# Patient Record
Sex: FEMALE
[De-identification: names, ages, dates, MRNs, and addresses within clinical notes are randomized per-mention and may not be internally consistent; named-entity substitution may affect disease eponyms.]

---

## 2018-04-17 ENCOUNTER — HOSPITAL ENCOUNTER (EMERGENCY)
Dept: HOSPITAL 97 - ER | Age: 33
Discharge: HOME | End: 2018-04-17
Payer: SELF-PAY

## 2018-04-17 DIAGNOSIS — N39.0: Primary | ICD-10-CM

## 2018-04-17 LAB — UA COMPLETE W REFLEX CULTURE PNL UR: (no result)

## 2018-04-17 PROCEDURE — 74018 RADEX ABDOMEN 1 VIEW: CPT

## 2018-04-17 PROCEDURE — 87088 URINE BACTERIA CULTURE: CPT

## 2018-04-17 PROCEDURE — 81025 URINE PREGNANCY TEST: CPT

## 2018-04-17 PROCEDURE — 99284 EMERGENCY DEPT VISIT MOD MDM: CPT

## 2018-04-17 PROCEDURE — 81003 URINALYSIS AUTO W/O SCOPE: CPT

## 2018-04-17 PROCEDURE — 87086 URINE CULTURE/COLONY COUNT: CPT

## 2018-04-17 PROCEDURE — 81015 MICROSCOPIC EXAM OF URINE: CPT

## 2018-04-17 NOTE — RAD REPORT
EXAM DESCRIPTION:  RAD - Abdomen 1 View (KUB) - 4/17/2018 2:51 am

 

CLINICAL HISTORY:  Hematuria

 

COMPARISON:  None.

 

FINDINGS:  No bowel obstruction, free air or pneumatosis. Large stool volume fills the right-side of 
the colon. No renal or ureteral calculi identifiable.

 

No significant bony findings

 

IMPRESSION:  No  calculi identifiable.

 

Large stool volume fills the right-side of the colon.

## 2018-04-17 NOTE — EDPHYS
Physician Documentation                                                                           

 [facility]                                                                

Name: Edwina Roe                                                                                

Age: 32 yrs                                                                                       

Sex: Female                                                                                       

: 1985                                                                                   

MRN: [id number]                                                                                   

Arrival Date: 2018                                                                          

Time: 02:04                                                                                       

Account#: [id number]                                                                            

Bed 17                                                                                            

Private MD:                                                                                       

ED Physician Richard Montoya                                                                      

HPI:                                                                                              

                                                                                             

02:28 This 32 yrs old  Female presents to ER via Ambulatory with complaints of        kb  

      Urinary Problem.                                                                            

02:28 The patient presents with urinary symptoms, dysuria, frequency, hematuria. Onset: The   kb  

      symptoms/episode began/occurred yesterday. Modifying factors: The symptoms are              

      alleviated by nothing, the symptoms are aggravated by urinating. Associated signs and       

      symptoms: Pertinent positives: dysuria, urinary frequency. Severity of symptoms: At         

      their worst the symptoms were mild, moderate, in the emergency department the symptoms      

      are unchanged. The patient has not experienced similar symptoms in the past. The            

      patient has not recently seen a physician.                                                  

                                                                                                  

OB/GYN:                                                                                           

02:09 LMP 3/20/2018                                                                           aa1 

                                                                                                  

Historical:                                                                                       

- Allergies:                                                                                      

02:09 No Known Allergies;                                                                     aa1 

- Home Meds:                                                                                      

02:09 None [Active];                                                                          aa1 

- PMHx:                                                                                           

02:09 vitamin D deficiency;                                                                   aa1 

- PSHx:                                                                                           

02:09 None;                                                                                   aa1 

                                                                                                  

- Immunization history:: Flu vaccine is not up to date.                                           

- Social history:: Smoking status: Patient/guardian denies using tobacco.                         

                                                                                                  

                                                                                                  

ROS:                                                                                              

02:27 Constitutional: Negative for fever, chills, and weight loss, Cardiovascular: Negative   kb  

      for chest pain, palpitations, and edema, Respiratory: Negative for shortness of breath,     

      cough, wheezing, and pleuritic chest pain, Abdomen/GI: Negative for abdominal pain,         

      nausea, vomiting, diarrhea, and constipation, MS/Extremity: Negative for injury and         

      deformity, Skin: Negative for injury, rash, and discoloration, Neuro: Negative for          

      headache, weakness, numbness, tingling, and seizure.                                        

02:27 : Positive for urinary symptoms, urinary frequency, hematuria, burning with urination.    

                                                                                                  

Exam:                                                                                             

02:27 Constitutional:  This is a well developed, well nourished patient who is awake, alert,  kb  

      and in no acute distress. Head/Face:  Normocephalic, atraumatic. Chest/axilla:  Normal      

      chest wall appearance and motion.  Nontender with no deformity.  No lesions are             

      appreciated. Cardiovascular:  Regular rate and rhythm with a normal S1 and S2.  No          

      gallops, murmurs, or rubs.  Normal PMI, no JVD.  No pulse deficits. Respiratory:  Lungs     

      have equal breath sounds bilaterally, clear to auscultation and percussion.  No rales,      

      rhonchi or wheezes noted.  No increased work of breathing, no retractions or nasal          

      flaring. Abdomen/GI:  Soft, non-tender, with normal bowel sounds.  No distension or         

      tympany.  No guarding or rebound.  No evidence of tenderness throughout. Skin:  Warm,       

      dry with normal turgor.  Normal color with no rashes, no lesions, and no evidence of        

      cellulitis. MS/ Extremity:  Pulses equal, no cyanosis.  Neurovascular intact.  Full,        

      normal range of motion. Neuro:  Awake and alert, GCS 15, oriented to person, place,         

      time, and situation.  Cranial nerves II-XII grossly intact.  Motor strength 5/5 in all      

      extremities.  Sensory grossly intact.  Cerebellar exam normal.  Normal gait.                

                                                                                                  

Vital Signs:                                                                                      

02:09  / 86; Pulse 86; Resp 16; Temp 98.0; Pulse Ox 100% on R/A; Weight 58.97 kg;       aa1 

      Height 5 ft. 3 in. (160.02 cm); Pain 6/10;                                                  

02:09 Body Mass Index 23.03 (58.97 kg, 160.02 cm)                                             aa1 

                                                                                                  

MDM:                                                                                              

02:16 Patient medically screened.                                                             kb  

02:28 Data reviewed: vital signs, nurses notes. Data interpreted: Pulse oximetry: on room air kb  

      is 100 %. Interpretation: normal. Counseling: I had a detailed discussion with the          

      patient and/or guardian regarding: the historical points, exam findings, and any            

      diagnostic results supporting the discharge/admit diagnosis, lab results, the need for      

      outpatient follow up, a family practitioner, to return to the emergency department if       

      symptoms worsen or persist or if there are any questions or concerns that arise at home.    

                                                                                                  

                                                                                             

02:16 Order name: Urine Microscopic Only; Complete Time: 15:05                                kb  

                                                                                             

02:43 Order name: Urine Dipstick--Ancillary (enter results); Complete Time: 15:05             em1 

                                                                                             

02:16 Order name: Urine Pregnancy Test (obtain specimen); Complete Time: 02:17                kb  

                                                                                             

02:16 Order name: Urine Dipstick-Ancillary (obtain specimen); Complete Time: 02:17            kb  

                                                                                             

02:35 Order name: Abdomen 1 View (KUB) XRAY; Complete Time: 15:05                             kb  

                                                                                             

02:44 Order name: Urine Pregnancy--Ancillary (enter results); Complete Time: 15:05            em1 

                                                                                                  

Administered Medications:                                                                         

No medications were administered                                                                  

                                                                                                  

                                                                                                  

Disposition:                                                                                      

08:35 Co-signature as Attending Physician, Richard Montoya MD I agree with the assessment and  [facility] 

      plan of care.                                                                               

                                                                                                  

Disposition:                                                                                      

18 02:57 Discharged to Home. Impression: Urinary tract infection, site not specified.       

- Condition is Stable.                                                                            

- Discharge Instructions: Urinary Tract Infection, Easy-to-Read.                                  

- Prescriptions for Augmentin 875- 125 mg Oral Tablet - take 1 tablet by ORAL route               

  every 12 hours for 7 days; 14 tablet. Pyridium 200 mg Oral Tablet - take 1 tablet by            

  ORAL route every 8 hours for 3 days; 9 tablet.                                                  

- Medication Reconciliation Form, Thank You Letter, Antibiotic Education, Prescription            

  Opioid Use form.                                                                                

- Follow up: Emergency Department; When: As needed; Reason: Worsening of condition.               

  Follow up: Private Physician; When: 2 - 3 days; Reason: Recheck today's complaints,             

  Continuance of care, Re-evaluation by your physician.                                           

                                                                                                  

                                                                                                  

                                                                                                  

Signatures:                                                                                       

Dispatcher [employer]                           Danay Lomeli, GELAC                 FRANCIEP-Shiela Noonan, RN                        RN   aa1                                                  

Richard Montoya MD MD   [facility]                                                  

                                                                                                  

**************************************************************************************************

## 2018-04-17 NOTE — ER
Nurse's Notes                                                                                     

 [facility]                                                                

Name: Edwina Roe                                                                                

Age: 32 yrs                                                                                       

Sex: Female                                                                                       

: 1985                                                                                   

MRN: [id number]                                                                                   

Arrival Date: 2018                                                                          

Time: 02:04                                                                                       

Account#: [id number]                                                                            

Bed 17                                                                                            

Private MD:                                                                                       

Diagnosis: Urinary tract infection, site not specified                                            

                                                                                                  

Presentation:                                                                                     

                                                                                             

02:08 Presenting complaint: Patient states: blood in urine and pain with urination since 2330 aa1 

      last night. Transition of care: patient was not received from another setting of care.      

      Onset of symptoms was 2018 at 23:30. Initial Sepsis Screen: Does the patient      

      meet any 2 criteria? No. Patient's initial sepsis screen is negative. Does the patient      

      have a suspected source of infection? Yes: Dysuria/Frequency/Urgency/UTI. Care prior to     

      arrival: None.                                                                              

02:08 Method Of Arrival: Ambulatory                                                           aa1 

02:08 Acuity: LUIS 4                                                                           aa1 

                                                                                                  

OB/GYN:                                                                                           

02:09 LMP 3/20/2018                                                                           aa1 

                                                                                                  

Historical:                                                                                       

- Allergies:                                                                                      

02:09 No Known Allergies;                                                                     aa1 

- Home Meds:                                                                                      

02:09 None [Active];                                                                          aa1 

- PMHx:                                                                                           

02:09 vitamin D deficiency;                                                                   aa1 

- PSHx:                                                                                           

02:09 None;                                                                                   aa1 

                                                                                                  

- Immunization history:: Flu vaccine is not up to date.                                           

- Social history:: Smoking status: Patient/guardian denies using tobacco.                         

                                                                                                  

                                                                                                  

Screenin:11 Abuse screen: Denies threats or abuse. Denies injuries from another. Nutritional        aa1 

      screening: No deficits noted. Tuberculosis screening: No symptoms or risk factors           

      identified. Fall Risk None identified.                                                      

                                                                                                  

Assessment:                                                                                       

02:11 General: Appears in no apparent distress. uncomfortable, Behavior is calm, cooperative, aa1 

      appropriate for age. Pain: Complains of pain in suprapubic area Quality of pain is          

      described as burning, pressure. Neuro: Level of Consciousness is awake, alert, obeys        

      commands, Oriented to person, place, time, situation, Moves all extremities. Full           

      function Gait is steady. Respiratory: Airway is patent Respiratory effort is even,          

      unlabored, Respiratory pattern is regular, symmetrical. GI: No signs and/or symptoms        

      were reported involving the gastrointestinal system. : Reports burning with               

      urination, blood in urine. EENT: No signs and/or symptoms were reported regarding the       

      EENT system. Derm: Skin is intact, is healthy with good turgor, Skin is pink, warm \T\      

      dry. Musculoskeletal: Circulation, motion, and sensation intact. Capillary refill < 3       

      seconds.                                                                                    

02:42 Reassessment: PT TO XRAY WITH [email or web address] TECH.                                                 bp  

02:54 Reassessment: PT RETURNED FROM XRAY. ALL CURRENT ORDERS COMPLETED, RESULTS AND DISPO    bp  

      PENDING.                                                                                    

03:02 Reassessment: Patient appears in no apparent distress at this time. Patient is alert,   aa1 

      oriented x 3, equal unlabored respirations, skin warm/dry/pink. Discussed d/c \T\ f/u       

      instructions with pt; denies questions or concerns at this time.                            

                                                                                                  

Vital Signs:                                                                                      

02:09  / 86; Pulse 86; Resp 16; Temp 98.0; Pulse Ox 100% on R/A; Weight 58.97 kg;       aa1 

      Height 5 ft. 3 in. (160.02 cm); Pain 6/10;                                                  

02:09 Body Mass Index 23.03 (58.97 kg, 160.02 cm)                                             aa1 

                                                                                                  

ED Course:                                                                                        

02:04 Patient arrived in ED.                                                                  aa1 

02:08 Shiela Finley, RN is Primary Nurse.                                                      aa1 

02:09 Triage completed.                                                                       aa1 

02:09 Arm band placed on right wrist. Patient placed in an exam room, on a stretcher.         aa1 

02:11 Patient has correct armband on for positive identification. Bed in low position. Call   aa1 

      light in reach. Pulse ox on. NIBP on.                                                       

02:11 Urine collected: clean catch specimen, blood tinged.                                    aa1 

02:16 [facility]                                                        kb  

02:16 Richard Montoya MD is Attending Physician.                                             kb  

02:46 Patient moved to radiology via wheelchair.                                              kw  

02:46 X-ray completed. Patient tolerated procedure well.                                      kw  

02:46 Patient moved back from radiology.                                                      kw  

02:47 Abdomen 1 View (KUB) XRAY In Process Unspecified.                                       EDMS

03:02 No provider procedures requiring assistance completed. Patient did not have IV access   aa1 

      during this emergency room visit.                                                           

                                                                                                  

Administered Medications:                                                                         

No medications were administered                                                                  

                                                                                                  

                                                                                                  

Outcome:                                                                                          

02:57 Discharge ordered by MD.                                                                kb  

03:02 Discharged to home ambulatory.                                                          aa1 

03:02 Condition: good                                                                             

03:02 Discharge instructions given to patient, Instructed on discharge instructions, follow       

      up and referral plans. medication usage, Demonstrated understanding of instructions,        

      follow-up care, medications, Prescriptions given X 2.                                       

03:03 Patient left the ED.                                                                    aa1 

                                                                                                  

Signatures:                                                                                       

Dispatcher [employer]                           EDDanay Taylor, ART MENDEZ-Shiela Noonan, RN                        RN   aa1                                                  

Kadie Cantrell Brian, RN                      RN   bp                                                   

                                                                                                  

**************************************************************************************************